# Patient Record
(demographics unavailable — no encounter records)

---

## 2024-11-03 NOTE — PHYSICAL EXAM
[Alert] : alert [Trachea Midline] : trachea midline [Normocephalic] : normocephalic [Symmetric Chest Rise] : symmetric chest rise [Clear to Auscultation Bilaterally] : clear to auscultation bilaterally [Normoactive Precordium] : normoactive precordium [Regular Rate and Rhythm] : regular rate and rhythm [Normal S1, S2 present] : normal S1, S2 present [No Spinal Dimple] : no spinal dimple [No Rash or Lesions] : no rash or lesions

## 2024-11-03 NOTE — PHYSICAL EXAM
[Alert] : alert [Normocephalic] : normocephalic [Trachea Midline] : trachea midline [Symmetric Chest Rise] : symmetric chest rise [Clear to Auscultation Bilaterally] : clear to auscultation bilaterally [Normoactive Precordium] : normoactive precordium [Regular Rate and Rhythm] : regular rate and rhythm [Normal S1, S2 present] : normal S1, S2 present [No Spinal Dimple] : no spinal dimple [No Rash or Lesions] : no rash or lesions

## 2024-11-03 NOTE — HISTORY OF PRESENT ILLNESS
[Father] : father [Normal] : Normal [Water heater temperature set at <120 degrees F] : Water heater temperature set at <120 degrees F [Car seat in back seat] : Car seat in back seat [Smoke Detectors] : Smoke detectors [Supervised play near cars and streets] : Supervised play near cars and streets [Carbon Monoxide Detectors] : Carbon monoxide detectors [Fruit] : fruit [Vegetables] : vegetables [Meat] : meat [Grains] : grains [Eggs] : eggs [Dairy] : dairy [___ stools per day] : [unfilled]  stools per day [Firm] : stools are firm consistency [___ voids per day] : [unfilled] voids per day [Sippy cup use] : Sippy cup use [Brushing teeth] : Brushing teeth [No] : Not at  exposure [Exposure to electronic nicotine delivery system] : No exposure to electronic nicotine delivery system [FreeTextEntry7] : Three year old male presents for well check visit. Father is working on getting [place] evaluated through a center to receive services for ASD. Having difficulty with adapting to new environments, etc.  [FreeTextEntry9] : Was initially in a 3K program. However, there was some difficulty in class with paying attention and was told they were unable to accommodate him.

## 2024-11-03 NOTE — DISCUSSION/SUMMARY
[No Feeding Concerns] : feeding [No Elimination Concerns] : elimination [No Skin Concerns] : skin [Family Support] : family support [Encouraging Literacy Activities] : encouraging literacy activities [Playing with Peers] : playing with peers [Promoting Physical Activity] : promoting physical activity [Safety] : safety [Parent/Guardian] : parent/guardian [Father] : father [FreeTextEntry1] : Three year old male with ASD presents for well check visit. Had a difficult time with examination and discussion as patient was upset the entire visit. Father will be reaching out to center for further evaluation of ASD resources.   Continue balanced diet with all food groups. Brush teeth twice a day with toothbrush. Recommend visit to dentist. As per car seat 's guidelines, use forward-facing car seat in back seat of car. Switch to booster seat when child reaches highest weight/height for seat. Child needs to ride in a belt-positioning booster seat until  4 feet 9 inches has been reached and are between 8 and 12 years of age. Put toddler to sleep in own bed. Help toddler to maintain consistent daily routines and sleep schedule. Pre-K discussed. Ensure home is safe. Use consistent, positive discipline. Read aloud to toddler. Limit screen time to no more than 2 hours per day.  Will follow-up in one year for next well check visit.